# Patient Record
Sex: FEMALE | Race: WHITE | NOT HISPANIC OR LATINO | Employment: FULL TIME | URBAN - METROPOLITAN AREA
[De-identification: names, ages, dates, MRNs, and addresses within clinical notes are randomized per-mention and may not be internally consistent; named-entity substitution may affect disease eponyms.]

---

## 2023-07-19 ENCOUNTER — HOSPITAL ENCOUNTER (EMERGENCY)
Facility: CLINIC | Age: 43
Discharge: HOME | End: 2023-07-19
Attending: EMERGENCY MEDICINE | Admitting: EMERGENCY MEDICINE
Payer: COMMERCIAL

## 2023-07-19 ENCOUNTER — APPOINTMENT (OUTPATIENT)
Dept: RADIOLOGY | Facility: CLINIC | Age: 43
End: 2023-07-19
Attending: EMERGENCY MEDICINE
Payer: COMMERCIAL

## 2023-07-19 VITALS
OXYGEN SATURATION: 98 % | SYSTOLIC BLOOD PRESSURE: 118 MMHG | BODY MASS INDEX: 28.51 KG/M2 | RESPIRATION RATE: 22 BRPM | HEART RATE: 84 BPM | HEIGHT: 68 IN | TEMPERATURE: 99.1 F | WEIGHT: 188.1 LBS | DIASTOLIC BLOOD PRESSURE: 63 MMHG

## 2023-07-19 DIAGNOSIS — R00.2 PALPITATIONS: ICD-10-CM

## 2023-07-19 DIAGNOSIS — R07.89 ATYPICAL CHEST PAIN: ICD-10-CM

## 2023-07-19 LAB
ANION GAP SERPL CALCULATED.3IONS-SCNC: 6 MMOL/L (ref 5–18)
ATRIAL RATE - MUSE: 89 BPM
BASOPHILS # BLD AUTO: 0.1 10E3/UL (ref 0–0.2)
BASOPHILS NFR BLD AUTO: 1 %
BUN SERPL-MCNC: 8 MG/DL (ref 8–22)
CALCIUM SERPL-MCNC: 8.7 MG/DL (ref 8.5–10.5)
CHLORIDE BLD-SCNC: 108 MMOL/L (ref 98–107)
CO2 SERPL-SCNC: 24 MMOL/L (ref 22–31)
CREAT SERPL-MCNC: 0.74 MG/DL (ref 0.6–1.1)
DIASTOLIC BLOOD PRESSURE - MUSE: 65 MMHG
EOSINOPHIL # BLD AUTO: 0.8 10E3/UL (ref 0–0.7)
EOSINOPHIL NFR BLD AUTO: 8 %
ERYTHROCYTE [DISTWIDTH] IN BLOOD BY AUTOMATED COUNT: 12.7 % (ref 10–15)
GFR SERPL CREATININE-BSD FRML MDRD: >90 ML/MIN/1.73M2
GLUCOSE BLD-MCNC: 105 MG/DL (ref 70–125)
HCT VFR BLD AUTO: 41 % (ref 35–47)
HGB BLD-MCNC: 13.8 G/DL (ref 11.7–15.7)
HOLD SPECIMEN: NORMAL
IMM GRANULOCYTES # BLD: 0 10E3/UL
IMM GRANULOCYTES NFR BLD: 0 %
INTERPRETATION ECG - MUSE: NORMAL
LYMPHOCYTES # BLD AUTO: 3.5 10E3/UL (ref 0.8–5.3)
LYMPHOCYTES NFR BLD AUTO: 36 %
MAGNESIUM SERPL-MCNC: 2 MG/DL (ref 1.8–2.6)
MCH RBC QN AUTO: 30.1 PG (ref 26.5–33)
MCHC RBC AUTO-ENTMCNC: 33.7 G/DL (ref 31.5–36.5)
MCV RBC AUTO: 89 FL (ref 78–100)
MONOCYTES # BLD AUTO: 0.7 10E3/UL (ref 0–1.3)
MONOCYTES NFR BLD AUTO: 8 %
NEUTROPHILS # BLD AUTO: 4.8 10E3/UL (ref 1.6–8.3)
NEUTROPHILS NFR BLD AUTO: 47 %
NRBC # BLD AUTO: 0 10E3/UL
NRBC BLD AUTO-RTO: 0 /100
P AXIS - MUSE: 68 DEGREES
PLATELET # BLD AUTO: 298 10E3/UL (ref 150–450)
POTASSIUM BLD-SCNC: 3.7 MMOL/L (ref 3.5–5)
PR INTERVAL - MUSE: 154 MS
QRS DURATION - MUSE: 82 MS
QT - MUSE: 328 MS
QTC - MUSE: 399 MS
R AXIS - MUSE: 74 DEGREES
RBC # BLD AUTO: 4.59 10E6/UL (ref 3.8–5.2)
SODIUM SERPL-SCNC: 138 MMOL/L (ref 136–145)
SYSTOLIC BLOOD PRESSURE - MUSE: 130 MMHG
T AXIS - MUSE: 50 DEGREES
T4 FREE SERPL-MCNC: 1.2 NG/DL (ref 0.9–1.7)
TROPONIN I SERPL-MCNC: <0.01 NG/ML (ref 0–0.29)
TSH SERPL DL<=0.005 MIU/L-ACNC: 7.23 UIU/ML (ref 0.3–5)
VENTRICULAR RATE- MUSE: 89 BPM
WBC # BLD AUTO: 9.8 10E3/UL (ref 4–11)

## 2023-07-19 PROCEDURE — 85025 COMPLETE CBC W/AUTO DIFF WBC: CPT | Performed by: EMERGENCY MEDICINE

## 2023-07-19 PROCEDURE — 83735 ASSAY OF MAGNESIUM: CPT | Performed by: EMERGENCY MEDICINE

## 2023-07-19 PROCEDURE — 80048 BASIC METABOLIC PNL TOTAL CA: CPT | Performed by: EMERGENCY MEDICINE

## 2023-07-19 PROCEDURE — 93005 ELECTROCARDIOGRAM TRACING: CPT | Performed by: EMERGENCY MEDICINE

## 2023-07-19 PROCEDURE — 36415 COLL VENOUS BLD VENIPUNCTURE: CPT | Performed by: EMERGENCY MEDICINE

## 2023-07-19 PROCEDURE — 84439 ASSAY OF FREE THYROXINE: CPT | Performed by: EMERGENCY MEDICINE

## 2023-07-19 PROCEDURE — 84443 ASSAY THYROID STIM HORMONE: CPT | Performed by: EMERGENCY MEDICINE

## 2023-07-19 PROCEDURE — 99285 EMERGENCY DEPT VISIT HI MDM: CPT | Mod: 25

## 2023-07-19 PROCEDURE — 71046 X-RAY EXAM CHEST 2 VIEWS: CPT

## 2023-07-19 PROCEDURE — 84484 ASSAY OF TROPONIN QUANT: CPT | Performed by: EMERGENCY MEDICINE

## 2023-07-19 NOTE — ED PROVIDER NOTES
"EMERGENCY DEPARTMENT ENCOUNTER      NAME: Kaylyn Snyder  AGE: 43 year old female  YOB: 1980  MRN: [id number]  EVALUATION DATE & TIME: 7/19/2023  5:12 AM    PCP: Tobias Kiran [facility]    ED PROVIDER: Shelby Stevenson MD      Chief Complaint   Patient presents with     Palpitations         FINAL IMPRESSION:  1. Palpitations    2. Atypical chest pain          ED COURSE & MEDICAL DECISION MAKING:    Pertinent Labs & Imaging studies reviewed. (See chart for details)  43 year old female presents to the Emergency Department for evaluation of feeling as if her heart was pounding.  Patient woke up for this morning, she was trying to get back to bed, around 4:30 AM she felt as if her heart was pounding.  She tried to cough, she tried to lean over to resolve her symptoms and it persisted.  Around 5 AM given her persistent symptoms, she awoke her  to come to the emergency department.  She states that she has associated shortness of breath, chest heaviness, feeling nauseated.  Her symptoms have since resolved, she reports some residual chest heaviness.  ECG with no acute ischemic changes.  Sinus rhythm with no arrhythmia appreciated.  Patient is hyperthyroid and is on medication with no recent changes.  No family history of premature CAD.  She reports that her dad has a history of \"rapid heart rate\".  She reports that her mom has a \"heart murmur\".  Currently, vital signs are stable, on cardiac exam, regular rate and rhythm.  Plan for laboratory studies including TSH, electrolytes.  Plan for single troponin to calculate heart score and a patient with atypical chest pain, and low risk.  Also plan for chest x-ray.  If all negative will discharge the patient with recommendations to follow-up in rapid access cardiology clinic.    Patient with a troponin within normal limits. Heart score of 1. TSH is slightly elevated, will reflexively check T4. Chest xray is unremarkable.     At " the conclusion of the encounter I discussed the results of all of the tests and the disposition. The questions were answered. The patient or family acknowledged understanding and was agreeable with the care plan.     5:18 AM I met with the patient for an interview and initial exam. Plans for treatment were discussed.  6:28 AM I updated the patient on laboratory studies.  Troponin is negative, electrolytes within normal limits.  TSH slightly elevated, I discussed with her to follow-up with her primary care provider for this.  I also discussed with her referral for rapid access cardiology clinic.  Patient comfortable with discharge home with follow-up plan.    0 minutes of critical care time     Medical Decision Making    History:    Supplemental history from: Documented in chart, if applicable    External Record(s) reviewed: Documented in chart, if applicable.    Work Up:    Chart documentation includes differential considered and any EKGs or imaging independently interpreted by provider, where specified.    In additional to work up documented, I considered the following work up: Documented in chart, if applicable.    External consultation:    Discussion of management with another provider: Documented in chart, if applicable    Complicating factors:    Care impacted by chronic illness: Hyperthyroidism    Care affected by social determinants of health: N/A    Disposition considerations: Discharge. I prescribed additional prescription strength medication(s) as charted. See documentation for any additional details.      MEDICATIONS GIVEN IN THE EMERGENCY:  Medications - No data to display    NEW PRESCRIPTIONS STARTED AT TODAY'S ER VISIT  New Prescriptions    No medications on file          =================================================================    HPI    Patient information was obtained from: The patient    Use of : N/A         Kaylyn NORTON Lillian is a 43 year old female with no pertinent history who  "presents to this ED via walk-in for evaluation of palpitaions.    The patient developed an onset of heavy pounding palpitations around 4:30 AM today. She has had these palpitations before but they would last for 3 seconds or resolve after \"bending over the bed and cough\". She associated heavy chest pressure, difficulty breathing, coughing, light-headedness, dizziness, hot flashes, anxiety, and mild nausea with her palpitations. Since initial onset,  Her palpitations has resolved during en route to the ED and is only having a cough.    She has a medical history oh heart burn and hyperthyroidism. She has not had any recent changes to her thyroid medications. She provides a family history of tachycardia from her father and heart murmur from her mother.    Otherwise, she feels fine and denied any other medical complaints or concerns at this time.      PAST MEDICAL HISTORY:  Past Medical History:   Diagnosis Date     Back pain     with prolonged sitting, referred to PT     Scoliosis        PAST SURGICAL HISTORY:  History reviewed. No pertinent surgical history.        CURRENT MEDICATIONS:    levothyroxine (SYNTHROID, LEVOTHROID) 50 MCG tablet        ALLERGIES:  No Known Allergies    FAMILY HISTORY:  Family History   Problem Relation Age of Onset     Cancer Paternal Grandmother         ?leukemia     Alzheimer Disease Maternal Grandmother      Interstitial Lung Disease Father         inhaled glue fumes at [employer]     Other - See Comments Father         Rapid heart beat.       SOCIAL HISTORY:   Social History     Socioeconomic History     Marital status:    Tobacco Use     Smoking status: Former     Smokeless tobacco: Never     Tobacco comments:     Quit in 2005.   Substance and Sexual Activity     Alcohol use: Yes     Comment: Mainly in the Summer- once per month.     Drug use: No     Sexual activity: Yes     Partners: Male       VITALS:  /59   Pulse 87   Temp 99.1  F (37.3  C) (Oral)   Resp 17   " "Ht 1.727 m (5' 8\")   Wt 85.3 kg (188 lb 1.6 oz)   LMP 07/05/2023 (Approximate)   SpO2 96%   BMI 28.60 kg/m      PHYSICAL EXAM    Constitutional: Well developed, Well nourished, NAD  HENT: Normocephalic, Atraumatic, Bilateral external ears normal, Oropharynx normal, mucous membranes moist, Nose normal.   Neck- Normal range of motion, No tenderness, Supple, No stridor.  Eyes: PERRL, EOMI, Conjunctiva normal, No discharge.   Respiratory: Normal breath sounds, No respiratory distress  Cardiovascular: Normal heart rate, Regular rhythm  GI: Bowel sounds normal, Soft, No tenderness,   Musculoskeletal: No edema. Good range of motion in all major joints. No tenderness to palpation or major deformities noted.  No calf tenderness.  Integument: Warm, Dry, No erythema, No rash  Neurologic: Alert & oriented x 3, Normal motor function, Normal sensory function, No focal deficits noted. Normal gait.   Psychiatric: Affect normal, Judgment normal, Mood normal.      LAB:  All pertinent labs reviewed and interpreted.  Results for orders placed or performed during the hospital encounter of 07/19/23   CBC with platelets and differential   Result Value Ref Range    WBC Count 9.8 4.0 - 11.0 10e3/uL    RBC Count 4.59 3.80 - 5.20 10e6/uL    Hemoglobin 13.8 11.7 - 15.7 g/dL    Hematocrit 41.0 35.0 - 47.0 %    MCV 89 78 - 100 fL    MCH 30.1 26.5 - 33.0 pg    MCHC 33.7 31.5 - 36.5 g/dL    RDW 12.7 10.0 - 15.0 %    Platelet Count 298 150 - 450 10e3/uL    % Neutrophils 47 %    % Lymphocytes 36 %    % Monocytes 8 %    % Eosinophils 8 %    % Basophils 1 %    % Immature Granulocytes 0 %    NRBCs per 100 WBC 0 <1 /100    Absolute Neutrophils 4.8 1.6 - 8.3 10e3/uL    Absolute Lymphocytes 3.5 0.8 - 5.3 10e3/uL    Absolute Monocytes 0.7 0.0 - 1.3 10e3/uL    Absolute Eosinophils 0.8 (H) 0.0 - 0.7 10e3/uL    Absolute Basophils 0.1 0.0 - 0.2 10e3/uL    Absolute Immature Granulocytes 0.0 <=0.4 10e3/uL    Absolute NRBCs 0.0 10e3/uL "       RADIOLOGY:  Reviewed all pertinent imaging. Please see official radiology report.  Chest XR,  PA & LAT    (Results Pending)       EKG:    Performed at: 07/19/2023 at 05:16    Impression: Sinus Rhythm. Normal ECG. No significant change was found when compared with ECG of 12/01/2014 at 08:01    Rate: 89 BPM  Rhythm: Sinus rhythm  UT Interval: 154 ms  QRS Interval: 82 ms  QTc Interval: 399 ms  ST Changes: No ST changes  Comparison: 12/01/2014 at 08:01    I have independently reviewed and interpreted the EKG(s) documented above.      I, Robert Pappas, am serving as a scribe to document services personally performed by Shelby Stevenson, based on my observation and the provider's statements to me. I, Shelby Stevenson MD, attest that Robert Pappas is acting in a scribe capacity, has observed my performance of the services and has documented them in accordance with my direction.    Shelby Stevenson MD  Emergency Medicine  [facility] EMERGENCY ROOM  [facility] [id number] [phone]       Shelby Stevenson MD  07/19/23 2000

## 2023-07-19 NOTE — DISCHARGE INSTRUCTIONS
Your TSH today is 7.23. The upper limit of normal is 5.0. We will check your T4; you can follow-up with your regular doctor for further work-up of this.

## 2023-07-19 NOTE — ED TRIAGE NOTES
Here for chest pressure and palpitations that started at 0400 today, also reports shortness of breath and cough for 1 month. Reports palpitations are gone now, but would still like to get checked out. No medications prior to arrival.      Triage Assessment     Row Name 07/19/23 0518       Triage Assessment (Adult)    Airway WDL WDL       Respiratory WDL    Respiratory WDL X;rhythm/pattern;cough    Rhythm/Pattern, Respiratory shortness of breath    Cough Type dry       Skin Circulation/Temperature WDL    Skin Circulation/Temperature WDL WDL       Cardiac WDL    Cardiac WDL X;chest pain       Chest Pain Assessment    Chest Pain Location midsternal    Character pressure    Chest Pain Intervention 12-lead ECG obtained;cardiac monitor placed       Peripheral/Neurovascular WDL    Peripheral Neurovascular WDL WDL       Cognitive/Neuro/Behavioral WDL    Cognitive/Neuro/Behavioral WDL WDL

## 2023-08-02 ENCOUNTER — OFFICE VISIT (OUTPATIENT)
Dept: CARDIOLOGY | Facility: CLINIC | Age: 43
End: 2023-08-02
Attending: EMERGENCY MEDICINE
Payer: COMMERCIAL

## 2023-08-02 VITALS
RESPIRATION RATE: 16 BRPM | HEIGHT: 68 IN | HEART RATE: 73 BPM | WEIGHT: 188 LBS | SYSTOLIC BLOOD PRESSURE: 100 MMHG | DIASTOLIC BLOOD PRESSURE: 74 MMHG | BODY MASS INDEX: 28.49 KG/M2

## 2023-08-02 DIAGNOSIS — E03.4 HYPOTHYROIDISM DUE TO ACQUIRED ATROPHY OF THYROID: ICD-10-CM

## 2023-08-02 DIAGNOSIS — E78.00 PURE HYPERCHOLESTEROLEMIA: ICD-10-CM

## 2023-08-02 DIAGNOSIS — R06.83 SNORING: ICD-10-CM

## 2023-08-02 DIAGNOSIS — I20.89 EFFORT ANGINA (H): ICD-10-CM

## 2023-08-02 DIAGNOSIS — R00.2 PALPITATIONS: Primary | ICD-10-CM

## 2023-08-02 PROBLEM — R07.89 ATYPICAL CHEST PAIN: Status: RESOLVED | Noted: 2023-08-02 | Resolved: 2023-08-02

## 2023-08-02 PROBLEM — R07.89 ATYPICAL CHEST PAIN: Status: ACTIVE | Noted: 2023-08-02

## 2023-08-02 PROCEDURE — 99204 OFFICE O/P NEW MOD 45 MIN: CPT | Performed by: INTERNAL MEDICINE

## 2023-08-02 RX ORDER — SUMATRIPTAN 100 MG/1
100 TABLET, FILM COATED ORAL
COMMUNITY
Start: 2022-11-09

## 2023-08-02 RX ORDER — PANTOPRAZOLE SODIUM 40 MG/1
1 TABLET, DELAYED RELEASE ORAL DAILY
COMMUNITY
Start: 2022-11-09

## 2023-08-02 NOTE — PATIENT INSTRUCTIONS
Ms. Kaylyn Snyder,  It certainly was nice to meet you today.  Per our conversation you're having some skipped beats in the chest.  This could represent an abnormal heartbeat and the reason I am checking the ultrasound stress of the heart and the heart monitor.  We will call you the results of these tests.    Ifeanyi Rodríguez

## 2023-08-02 NOTE — PROGRESS NOTES
[facility] Heart Care Office Consult     Assessment:   (R00.2) Palpitations  (primary encounter diagnosis)  Comment: Symptomatic innocent PACs and PVCs, however happens just maybe once or twice a month.  Given this, will arrange for 30-day event monitor, she states these are mainly at nighttime.  Given this, I would recommend sleep study as well as a 30-day event monitor.  We will also check structural heart disease with a limited stress echo.  If these are abnormal we will address further.  Might consider beta-blocker therapy.    (I20.8) Effort angina (H)  Comment: She gets a tightness in the chest while doing activity, given this, would like to arrange for stress echo.  She has no real risk factors for coronary artery disease other than high lipids.    (E78.00) Pure hypercholesterolemia  Comment: I suspect given the LDL of 174 she has heterozygous familial hypercholesterolemia, especially with a cholesteatoma under the left eye.  She states her parents have high cholesterol but nobody in the family has had strokes or heart attacks.  Would recommend tight dietary management, consider calcium score in the future if stress echo looks normal.    (E03.4) Hypothyroidism due to acquired atrophy of thyroid  Comment: So noted with slightly elevated TSH with normal T4 while in the emergency department for palpitations.  Will defer to primary care provider whether we can back off on Synthroid or not.    (R06.83) Snoring  Comment: Was told to have a sleep study several years ago, given her somewhat redundant chin and jaw would recommend sleep study especially since these episodes happen at nighttime.     Plan:   1.  2-day event monitor looking for arrhythmias and address if found.  2.  Work on weight loss.  3.  Arrange for sleep study.  4.  Stress echo.  5.  Follow-up thereafter if these are abnormal.    History of Present Illness:   Thank you for asking the [facility] Heart Care team to see Kaylyn Porter  year old  female  in consultation  to evaluate palpitations.   Patient tells me for about 5 years now she has had heartbeats mainly at nighttime where she will feel a skip or 2.  She states she had one episode after getting up to go to the bathroom about 4:00 in the morning and that for about 30 minutes around 4:30 in the morning her heart was racing, she felt lightheaded and short of breath.  She woke up her  who brought her to the [facility] emergency department where ECG was unremarkable as was blood work and she was referred to the rapid access clinic.  She states since then, she has had 1 episode just of 2 skips and normally she get these maybe once a month or so for the last 5 years.  In addition to above, she admits to having high cholesterol and been told to be taken treatment in the past.  She tells me she was evaluated for fatigue by her primary physician and told to have a sleep study but never did this.  Lastly, she states over the last 5 years when she sometimes mows the lawn she gets a tightness in her chest that goes away with rest.    Past Medical History:     Past Medical History:   Diagnosis Date    Back pain     with prolonged sitting, referred to PT    Scoliosis  Acquired hypothyroidism      Past history is negative for cancer, tuberculosis, diabetes mellitus, myocardial infarction,  rheumatic fever, hypertension, cerebrovascular accident, chronic kidney disease, peptic ulcer disease, chronic obstructive pulmonary disease,  and no lipid disorder.     Past Surgical History:   No past surgical history on file.    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 She has never used smokeless tobacco. She reports current alcohol use. She reports that she does not use drugs. The primary care physician is Megan Sen  "P    Meds:   Scheduled Meds:  Current Outpatient Medications   Medication Sig Dispense Refill    levothyroxine (SYNTHROID, LEVOTHROID) 50 MCG tablet Take 1 tablet (50 mcg) by mouth daily      pantoprazole (PROTONIX) 40 MG EC tablet Take 1 tablet by mouth daily      SUMAtriptan (IMITREX) 100 MG tablet Take 100 mg by mouth at onset of headache for migraine         PRN Meds:.    Allergies:   Patient has no known allergies.    Objective:      Physical Exam  85.3 kg (188 lb)  1.727 m (5' 8\")  Body mass index is 28.59 kg/m .  /74 (BP Location: Left arm, Patient Position: Sitting, Cuff Size: Adult Regular)   Pulse 73   Resp 16   Ht 1.727 m (5' 8\")   Wt 85.3 kg (188 lb)   LMP 07/05/2023 (Approximate)   BMI 28.59 kg/m      General Appearance:   Alert, cooperative and in no acute distress.   HEENT:  No scleral icterus; the mucous membranes were pink and moist.   Neck: JVP normal. No thyromegaly. No HJR   Chest: The spine was straight. The chest was symmetric.   Lungs:   Respirations unlabored; the lungs are clear to auscultation.   Cardiovascular:   S1 and S2 without murmur, clicks or rubs. Brachial, radial, carotid and posterior tibial pulses are intact and symetrical.  No carotid bruits noted   Abdomen:  No organomegaly, masses, bruits, or tenderness. Bowels sounds are present   Extremities: No cyanosis, clubbing, or edema.   Skin: No xanthelasma.   Neurologic: Mood and affect are appropriate.         Lab Reviewed Personally by myself  Lab Results   Component Value Date     07/19/2023     11/13/2015    CO2 24 07/19/2023    CO2 26 11/13/2015    BUN 8 07/19/2023    BUN 7 11/13/2015     Lab Results   Component Value Date    WBC 9.8 07/19/2023    WBC 6.4 11/13/2015    HGB 13.8 07/19/2023    HGB 13.4 11/13/2015    HCT 41.0 07/19/2023    HCT 39.6 11/13/2015    MCV 89 07/19/2023    MCV 90 11/13/2015     07/19/2023     11/13/2015     No results found for: CHOL, TRIG, HDL, LDLDIRECT  No results " "found for: BNP    ECG personally reviewed by myself shows normal sinus rhythm, within normal limits.     Review of Systems:     Review of Systems:   Enc Vitals  BP: 100/74  Pulse: 73  Resp: 16  Weight: 85.3 kg (188 lb) (Wiht boots.)  Height: 172.7 cm (5' 8\")                                          "

## 2023-08-02 NOTE — LETTER
8/2/2023    JASWANT OSORIO MD  [address]  [place] [address]    RE: Kaylyn Snyder       Dear Colleague,     I had the pleasure of seeing Kaylyn Snyder in the [facility].    [facility] Heart Care Office Consult     Assessment:   (R00.2) Palpitations  (primary encounter diagnosis)  Comment: Symptomatic innocent PACs and PVCs, however happens just maybe once or twice a month.  Given this, will arrange for 30-day event monitor, she states these are mainly at nighttime.  Given this, I would recommend sleep study as well as a 30-day event monitor.  We will also check structural heart disease with a limited stress echo.  If these are abnormal we will address further.  Might consider beta-blocker therapy.    (I20.8) Effort angina (H)  Comment: She gets a tightness in the chest while doing activity, given this, would like to arrange for stress echo.  She has no real risk factors for coronary artery disease other than high lipids.    (E78.00) Pure hypercholesterolemia  Comment: I suspect given the LDL of 174 she has heterozygous familial hypercholesterolemia, especially with a cholesteatoma under the left eye.  She states her parents have high cholesterol but nobody in the family has had strokes or heart attacks.  Would recommend tight dietary management, consider calcium score in the future if stress echo looks normal.    (E03.4) Hypothyroidism due to acquired atrophy of thyroid  Comment: So noted with slightly elevated TSH with normal T4 while in the emergency department for palpitations.  Will defer to primary care provider whether we can back off on Synthroid or not.    (R06.83) Snoring  Comment: Was told to have a sleep study several years ago, given her somewhat redundant chin and jaw would recommend sleep study especially since these episodes happen at nighttime.     Plan:   1.  2-day event monitor looking for arrhythmias and address if found.  2.  Work on weight  How Severe Are Your Molluscum?: mild Is This A New Presentation, Or A Follow-Up?: Skin Lesions loss.  3.  Arrange for sleep study.  4.  Stress echo.  5.  Follow-up thereafter if these are abnormal.    History of Present Illness:   Thank you for asking the [facility] Heart Care team to see Kaylyn Snyder a 43 year old  female  in consultation  to evaluate palpitations.   Patient tells me for about 5 years now she has had heartbeats mainly at nighttime where she will feel a skip or 2.  She states she had one episode after getting up to go to the bathroom about 4:00 in the morning and that for about 30 minutes around 4:30 in the morning her heart was racing, she felt lightheaded and short of breath.  She woke up her  who brought her to the [facility] emergency department where ECG was unremarkable as was blood work and she was referred to the rapid access clinic.  She states since then, she has had 1 episode just of 2 skips and normally she get these maybe once a month or so for the last 5 years.  In addition to above, she admits to having high cholesterol and been told to be taken treatment in the past.  She tells me she was evaluated for fatigue by her primary physician and told to have a sleep study but never did this.  Lastly, she states over the last 5 years when she sometimes mows the lawn she gets a tightness in her chest that goes away with rest.    Past Medical History:     Past Medical History:   Diagnosis Date    Back pain     with prolonged sitting, referred to PT    Scoliosis  Acquired hypothyroidism      Past history is negative for cancer, tuberculosis, diabetes mellitus, myocardial infarction,  rheumatic fever, hypertension, cerebrovascular accident, chronic kidney disease, peptic ulcer disease, chronic obstructive pulmonary disease,  and no lipid disorder.     Past Surgical History:   No past surgical history on file.    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 She has never used smokeless tobacco. She reports current alcohol use. She reports that she does not use drugs. The primary care physician is Megan Sen    Meds:   Scheduled Meds:  Current Outpatient Medications   Medication Sig Dispense Refill    levothyroxine (SYNTHROID, LEVOTHROID) 50 MCG tablet Take 1 tablet (50 mcg) by mouth daily      pantoprazole (PROTONIX) 40 MG EC tablet Take 1 tablet by mouth daily      SUMAtriptan (IMITREX) 100 MG tablet Take 100 mg by mouth at onset of headache for migraine         PRN Meds:.    Allergies:   Patient has no known allergies.    Objective:      Physical Exam  85.3 kg (188 lb)  1.727 m (5' 8\")  Body mass index is 28.59 kg/m .  /74 (BP Location: Left arm, Patient Position: Sitting, Cuff Size: Adult Regular)   Pulse 73   Resp 16   Ht 1.727 m (5' 8\")   Wt 85.3 kg (188 lb)   LMP 07/05/2023 (Approximate)   BMI 28.59 kg/m      General Appearance:   Alert, cooperative and in no acute distress.   HEENT:  No scleral icterus; the mucous membranes were pink and moist.   Neck: JVP normal. No thyromegaly. No HJR   Chest: The spine was straight. The chest was symmetric.   Lungs:   Respirations unlabored; the lungs are clear to auscultation.   Cardiovascular:   S1 and S2 without murmur, clicks or rubs. Brachial, radial, carotid and posterior tibial pulses are intact and symetrical.  No carotid bruits noted   Abdomen:  No organomegaly, masses, bruits, or tenderness. Bowels sounds are present   Extremities: No cyanosis, clubbing, or edema.   Skin: No xanthelasma.   Neurologic: Mood and affect are appropriate.         Lab Reviewed Personally by myself  Lab Results   Component Value Date     07/19/2023     11/13/2015    CO2 24 07/19/2023    CO2 26 11/13/2015    BUN 8 07/19/2023    BUN 7 11/13/2015     Lab Results   Component Value Date    WBC 9.8 07/19/2023    WBC 6.4 11/13/2015    HGB 13.8 07/19/2023 " "   HGB 13.4 11/13/2015    HCT 41.0 07/19/2023    HCT 39.6 11/13/2015    MCV 89 07/19/2023    MCV 90 11/13/2015     07/19/2023     11/13/2015     No results found for: CHOL, TRIG, HDL, LDLDIRECT  No results found for: BNP    ECG personally reviewed by myself shows normal sinus rhythm, within normal limits.     Review of Systems:     Review of Systems:   Enc Vitals  BP: 100/74  Pulse: 73  Resp: 16  Weight: 85.3 kg (188 lb) (Wiht boots.)  Height: 172.7 cm (5' 8\")                                              Thank you for allowing me to participate in the care of your patient.      Sincerely,     NAM SOUZA MD     [facility] Heart Care  cc:   Shelby Stevenson MD  [address] [place] [address]      "

## 2023-08-08 ENCOUNTER — TELEPHONE (OUTPATIENT)
Dept: CARDIOLOGY | Facility: CLINIC | Age: 43
End: 2023-08-08
Payer: COMMERCIAL

## 2023-08-08 DIAGNOSIS — R06.83 SNORING: ICD-10-CM

## 2023-08-08 DIAGNOSIS — R00.2 PALPITATIONS: Primary | ICD-10-CM

## 2023-08-08 NOTE — TELEPHONE ENCOUNTER
----- Message from Ifeanyi Rodríguez MD sent at 8/2/2023 12:04 PM CDT -----  Can you set her up for sleep apnea evaluation, start with a virtual visit with a pulmonologist and may be an at home sleep study.  LF

## 2023-08-08 NOTE — TELEPHONE ENCOUNTER
Referral placed. Called Kaylyn and updated her on this. Sleep medicine number provided if she does not hear from anyone in a few days. -[facility]

## 2023-08-14 ENCOUNTER — HOSPITAL ENCOUNTER (OUTPATIENT)
Dept: CARDIOLOGY | Facility: CLINIC | Age: 43
Discharge: HOME | End: 2023-08-14
Attending: INTERNAL MEDICINE
Payer: COMMERCIAL

## 2023-08-14 DIAGNOSIS — R00.2 PALPITATIONS: ICD-10-CM

## 2023-08-14 DIAGNOSIS — I20.89 EFFORT ANGINA (H): ICD-10-CM

## 2023-08-14 PROCEDURE — 93321 DOPPLER ECHO F-UP/LMTD STD: CPT | Mod: 26 | Performed by: GENERAL ACUTE CARE HOSPITAL

## 2023-08-14 PROCEDURE — 93018 CV STRESS TEST I&R ONLY: CPT | Performed by: GENERAL ACUTE CARE HOSPITAL

## 2023-08-14 PROCEDURE — 999N000208 ECHO STRESS ECHOCARDIOGRAM

## 2023-08-14 PROCEDURE — 93016 CV STRESS TEST SUPVJ ONLY: CPT | Performed by: GENERAL ACUTE CARE HOSPITAL

## 2023-08-14 PROCEDURE — 93321 DOPPLER ECHO F-UP/LMTD STD: CPT | Mod: TC

## 2023-08-14 PROCEDURE — 93325 DOPPLER ECHO COLOR FLOW MAPG: CPT | Mod: 26 | Performed by: GENERAL ACUTE CARE HOSPITAL

## 2023-08-14 PROCEDURE — 255N000002 HC RX 255 OP 636: Performed by: INTERNAL MEDICINE

## 2023-08-14 PROCEDURE — 93350 STRESS TTE ONLY: CPT | Mod: 26 | Performed by: GENERAL ACUTE CARE HOSPITAL

## 2023-08-14 PROCEDURE — 93352 ADMIN ECG CONTRAST AGENT: CPT | Performed by: GENERAL ACUTE CARE HOSPITAL

## 2023-08-14 PROCEDURE — 93270 REMOTE 30 DAY ECG REV/REPORT: CPT

## 2023-08-14 RX ADMIN — PERFLUTREN 4 ML: 6.52 INJECTION, SUSPENSION INTRAVENOUS at 13:33

## 2023-09-15 PROCEDURE — 93272 ECG/REVIEW INTERPRET ONLY: CPT | Performed by: INTERNAL MEDICINE
